# Patient Record
Sex: MALE | Race: WHITE | URBAN - NONMETROPOLITAN AREA
[De-identification: names, ages, dates, MRNs, and addresses within clinical notes are randomized per-mention and may not be internally consistent; named-entity substitution may affect disease eponyms.]

---

## 2020-06-23 ENCOUNTER — OFFICE VISIT (OUTPATIENT)
Dept: FAMILY MEDICINE CLINIC | Age: 60
End: 2020-06-23
Payer: COMMERCIAL

## 2020-06-23 VITALS
HEIGHT: 70 IN | SYSTOLIC BLOOD PRESSURE: 150 MMHG | DIASTOLIC BLOOD PRESSURE: 98 MMHG | BODY MASS INDEX: 42.52 KG/M2 | WEIGHT: 297 LBS

## 2020-06-23 PROBLEM — L25.9 CONTACT DERMATITIS: Status: ACTIVE | Noted: 2020-06-23

## 2020-06-23 PROBLEM — E66.01 MORBID OBESITY (HCC): Status: ACTIVE | Noted: 2020-06-23

## 2020-06-23 PROCEDURE — 96372 THER/PROPH/DIAG INJ SC/IM: CPT | Performed by: FAMILY MEDICINE

## 2020-06-23 PROCEDURE — 99213 OFFICE O/P EST LOW 20 MIN: CPT | Performed by: FAMILY MEDICINE

## 2020-06-23 RX ORDER — TRIAMCINOLONE ACETONIDE 40 MG/ML
40 INJECTION, SUSPENSION INTRA-ARTICULAR; INTRAMUSCULAR ONCE
Status: COMPLETED | OUTPATIENT
Start: 2020-06-23 | End: 2020-06-23

## 2020-06-23 RX ADMIN — TRIAMCINOLONE ACETONIDE 40 MG: 40 INJECTION, SUSPENSION INTRA-ARTICULAR; INTRAMUSCULAR at 11:34

## 2020-06-23 NOTE — PROGRESS NOTES
ZACHARY Barr am scribing for and in the presence of Dr. Mayra Montilla. 6/23/20/11:15am/SNP    [address] [address]  [place] [id number]  Dept: [phone]    HPI:  Diane Fraga is a 64 y.o. male who presents for evaluation of rash involving the forearm. Rash started 4 days ago. Lesions are red in color, and blistering in texture. Rash is pruritic. No current outpatient medications on file. No current facility-administered medications for this visit. ROS:  Skin: Admits rash, admits itching    No past surgical history on file. No family history on file. No past medical history on file. Social History     Tobacco Use    Smoking status: Never Smoker    Smokeless tobacco: Never Used   Substance Use Topics    Alcohol use: Not on file      No current outpatient medications on file. No current facility-administered medications for this visit. No Known Allergies    PHYSICAL EXAM:    BP (!) 150/98 (Site: Left Upper Arm, Position: Sitting, Cuff Size: Large Adult)   Ht 5' 10\" (1.778 m)   Wt 297 lb (134.7 kg)   BMI 42.62 kg/m²   Wt Readings from Last 3 Encounters:   06/23/20 297 lb (134.7 kg)     BP Readings from Last 3 Encounters:   06/23/20 (!) 150/98     General Appearance: in no acute distress, well developed, well nourished. Oral Cavity: mucosa moist.  Throat: clear. Neck/Thyroid: neck supple, full range of motion  Skin: warm and dry. Small patches of vesicular lesions on R side of abdomen, confluent bolus lesions with central open excoriated area involving entire volar surface of R arm, more diffused papular lesions on upper arm  Psych: normal affect, speech fluent. ASSESSMENT:   Diagnosis Orders   1. Screening for diabetes mellitus (DM)  Basic Metabolic Panel    CBC    Hemoglobin A1C   2. Screening cholesterol level  ALT    AST    Basic Metabolic Panel    CBC    Hemoglobin A1C    Lipid Panel   3.  Screening PSA (prostate specific antigen)  Psa screening   4. Contact dermatitis, unspecified contact dermatitis type, unspecified trigger  DC INJECTION,THERAP/PROPH/DIAGNOST, IM OR SUBCUT   5. Morbid obesity ([address]       PLAN:  I will treat him with a Kenalog shot. I also discuss with him and his wife, his morbid obesity, hypertension, the possibility of heart disease and his family history. He seems to refusing any interventions but did accept lab orders and I will call with results once received. Orders Placed This Encounter   Procedures    ALT     Standing Status:   Future     Standing Expiration Date:   6/23/2021    AST     Standing Status:   Future     Standing Expiration Date:   6/23/2021    Basic Metabolic Panel     Standing Status:   Future     Standing Expiration Date:   6/23/2021    CBC     Standing Status:   Future     Standing Expiration Date:   6/23/2021    Hemoglobin A1C     Standing Status:   Future     Standing Expiration Date:   6/23/2021    Lipid Panel     Standing Status:   Future     Standing Expiration Date:   6/23/2021     Order Specific Question:   Is Patient Fasting?/# of Hours     Answer:   no fasting    Psa screening     Standing Status:   Future     Standing Expiration Date:   6/23/2021    DC INJECTION,THERAP/PROPH/DIAGNOST, IM OR SUBCUT     Orders Placed This Encounter   Medications    triamcinolone acetonide (KENALOG-40) injection 40 mg   I, Dr. Bill Clemons, personally performed the services described in this documentation as scribed by CHAVA Desir in my presence, and is both accurate and complete.

## 2020-06-24 LAB
ABSOLUTE BASO #: 0.1 X10E9/L (ref 0–0.9)
ABSOLUTE EOS #: 0.3 X10E9/L (ref 0–0.4)
ABSOLUTE LYMPH #: 1.2 X10E9/L (ref 1–3.5)
ABSOLUTE MONO #: 0.5 X10E9/L (ref 0–0.9)
ABSOLUTE NEUT #: 5.6 X10E9/L (ref 1.5–6.6)
ALT SERPL-CCNC: 22 U/L (ref 0–40)
ANION GAP SERPL CALCULATED.3IONS-SCNC: 11 MMOL/L (ref 5–15)
AST SERPL-CCNC: 20 U/L (ref 0–41)
AVERAGE GLUCOSE: 108 MG/DL
BASOPHILS RELATIVE PERCENT: 0.8 %
BUN BLDV-MCNC: 12 MG/DL (ref 5–23)
CALCIUM SERPL-MCNC: 9.7 MG/DL (ref 8.5–10.5)
CHLORIDE BLD-SCNC: 105 MMOL/L (ref 98–109)
CHOLESTEROL/HDL RATIO: 4.6 (ref 1–5)
CHOLESTEROL: 187 MG/DL (ref 150–200)
CO2: 25 MMOL/L (ref 22–32)
CREAT SERPL-MCNC: 0.83 MG/DL (ref 0.6–1.3)
EGFR AFRICAN AMERICAN: >60 ML/MIN/1.73SQ.M
EGFR IF NONAFRICAN AMERICAN: >60 ML/MIN/1.73SQ.M
EOSINOPHILS RELATIVE PERCENT: 3.4 %
GLUCOSE: 96 MG/DL (ref 65–99)
HBA1C MFR BLD: 5.4 % (ref 4.4–6.4)
HCT VFR BLD CALC: 42.3 % (ref 39–49)
HDLC SERPL-MCNC: 41 MG/DL
HEMOGLOBIN: 14.4 G/DL (ref 13.1–17.3)
LDL CHOLESTEROL CALCULATED: 129 MG/DL
LDL/HDL RATIO: 3.1
LYMPHOCYTE %: 16 %
MCH RBC QN AUTO: 30.7 PG (ref 27–35)
MCHC RBC AUTO-ENTMCNC: 34.1 G/DL (ref 32–36)
MCV RBC AUTO: 90 FL (ref 81–101)
MONOCYTES # BLD: 6.4 %
NEUTROPHILS RELATIVE PERCENT: 73.4 %
PDW BLD-RTO: 14.2 % (ref 11.4–14.3)
PLATELETS: 292 X10E9/L (ref 150–450)
PMV BLD AUTO: 9.2 FL (ref 7–12)
POTASSIUM SERPL-SCNC: 3.7 MMOL/L (ref 3.5–5)
PSA, ULTRASENSITIVE: 0.26 NG/ML (ref 0–4)
RBC: 4.71 X10E12/L (ref 4.25–5.65)
SODIUM BLD-SCNC: 141 MMOL/L (ref 134–146)
TRIGL SERPL-MCNC: 84 MG/DL (ref 27–150)
VLDLC SERPL CALC-MCNC: 17 MG/DL (ref 0–30)
WBC: 7.6 X10E9/L (ref 4.8–10.8)

## 2020-06-24 NOTE — RESULT ENCOUNTER NOTE
Notify lab is essentially normal aside from LDL cholesterol which I would suggest goal of 100 given his risk factors  All else is very good  Including A1c which is very good

## 2020-12-17 ENCOUNTER — TELEPHONE (OUTPATIENT)
Dept: FAMILY MEDICINE CLINIC | Age: 60
End: 2020-12-17

## 2022-04-27 ENCOUNTER — TELEPHONE (OUTPATIENT)
Dept: FAMILY MEDICINE CLINIC | Age: 62
End: 2022-04-27